# Patient Record
Sex: MALE | Race: WHITE | URBAN - METROPOLITAN AREA
[De-identification: names, ages, dates, MRNs, and addresses within clinical notes are randomized per-mention and may not be internally consistent; named-entity substitution may affect disease eponyms.]

---

## 2024-07-23 NOTE — H&P
HPI:     Berry Evangelista is a 34 year old male who presents as a consult for vasectomy.    Number of children: 2    He desires permanent sterility via bilateral vasectomy. He understands the risks of bleeding, infection, chronic pain, atrophy, the one in 1000 risk of recanalization. He understands that he should continue to use contraception until we have a semen sample showing no sperm present. He also understands the need to wear a scrotal supporter for a minimum of one week. He also understands that he is not to lift anything heavier than 10 pounds for the next 4 days.  Patient was given vasectomy information and office protocol pre-operative and post-operative instructions.      exam: easily palpable vas bilaterally    Schedule for vasectomy.    HISTORY:  History reviewed. No pertinent past medical history.   History reviewed. No pertinent surgical history.   History reviewed. No pertinent family history.   Social History:   Social History     Socioeconomic History    Marital status:         Medications (Active prior to today's visit):  Current Outpatient Medications   Medication Sig Dispense Refill    Fexofenadine HCl (ALLERGY 24-HR OR) Take by mouth.      diazePAM (VALIUM) 10 MG Oral Tab Take 1 tablet (10 mg total) by mouth See Admin Instructions. Take 1-2 tablets by mouth 20-30 minutes before procedure. 2 tablet 0    traMADol 50 MG Oral Tab Take 1 tablet (50 mg total) by mouth every 6 (six) hours as needed for Pain. 15 tablet 0       Allergies:  No Known Allergies      ROS:     A comprehensive 10 point review of systems was completed.  Pertinent positives and negatives noted in the the HPI.    PHYSICAL EXAM:     GENERAL APPEARANCE: well, developed, well nourished, in no acute distress  NEUROLOGIC: nonfocal, alert and oriented  HEAD: normocephalic, atraumatic  EYES: sclera non-icteric  EARS: hearing intact  ORAL CAVITY: mucosa moist  NECK/THYROID: no obvious goiter or masses  LUNGS: nonlabored  breathing  ABDOMEN: soft, no obvious masses or tenderness  SKIN: no obvious rashes    : as noted above     ASSESSMENT/PLAN:   Diagnoses and all orders for this visit:    Encounter for sterilization  -     diazePAM (VALIUM) 10 MG Oral Tab; Take 1 tablet (10 mg total) by mouth See Admin Instructions. Take 1-2 tablets by mouth 20-30 minutes before procedure.  -     traMADol 50 MG Oral Tab; Take 1 tablet (50 mg total) by mouth every 6 (six) hours as needed for Pain.    - as noted above    Thanks again for this consult.    Patrick Whaley MD, FACS  Urologist  [facility]

## 2024-07-30 ENCOUNTER — OFFICE VISIT (OUTPATIENT)
Dept: SURGERY | Facility: CLINIC | Age: 34
End: 2024-07-30

## 2024-07-30 ENCOUNTER — TELEPHONE (OUTPATIENT)
Dept: SURGERY | Facility: CLINIC | Age: 34
End: 2024-07-30

## 2024-07-30 DIAGNOSIS — Z30.2 ENCOUNTER FOR STERILIZATION: Primary | ICD-10-CM

## 2024-07-30 PROCEDURE — 99244 OFF/OP CNSLTJ NEW/EST MOD 40: CPT | Performed by: UROLOGY

## 2024-07-30 RX ORDER — DIAZEPAM 10 MG/1
10 TABLET ORAL SEE ADMIN INSTRUCTIONS
Qty: 2 TABLET | Refills: 0 | Status: SHIPPED | OUTPATIENT
Start: 2024-07-30

## 2024-07-30 RX ORDER — TRAMADOL HYDROCHLORIDE 50 MG/1
50 TABLET ORAL EVERY 6 HOURS PRN
Qty: 15 TABLET | Refills: 0 | Status: SHIPPED | OUTPATIENT
Start: 2024-07-30

## 2024-07-30 NOTE — TELEPHONE ENCOUNTER
Per patient due to conflict of his schedule he needs to reschedule his vasectomy appointment. Please advise

## 2024-09-09 NOTE — PROGRESS NOTES
HPI:     Berry Evangelista is a 34 year old male who presents as a consult for vasectomy.    Number of children: 2    He desires permanent sterility via bilateral vasectomy. He understands the risks of bleeding, infection, chronic pain, atrophy, the one in 1000 risk of recanalization. He understands that he should continue to use contraception until we have a semen sample showing no sperm present. He also understands the need to wear a scrotal supporter for a minimum of one week. He also understands that he is not to lift anything heavier than 10 pounds for the next 4 days.  Patient was given vasectomy information and office protocol pre-operative and post-operative instructions.      exam: easily palpable vas bilaterally    Schedule for vasectomy.    HISTORY:  No past medical history on file.   No past surgical history on file.   No family history on file.   Social History:   Social History     Socioeconomic History    Marital status:         Medications (Active prior to today's visit):  Current Outpatient Medications   Medication Sig Dispense Refill    Fexofenadine HCl (ALLERGY 24-HR OR) Take by mouth.      diazePAM (VALIUM) 10 MG Oral Tab Take 1 tablet (10 mg total) by mouth See Admin Instructions. Take 1-2 tablets by mouth 20-30 minutes before procedure. 2 tablet 0    traMADol 50 MG Oral Tab Take 1 tablet (50 mg total) by mouth every 6 (six) hours as needed for Pain. 15 tablet 0       Allergies:  No Known Allergies      ROS:     A comprehensive 10 point review of systems was completed.  Pertinent positives and negatives noted in the the HPI.    PHYSICAL EXAM:     GENERAL APPEARANCE: well, developed, well nourished, in no acute distress  NEUROLOGIC: nonfocal, alert and oriented  HEAD: normocephalic, atraumatic  EYES: sclera non-icteric  EARS: hearing intact  ORAL CAVITY: mucosa moist  NECK/THYROID: no obvious goiter or masses  LUNGS: nonlabored breathing  ABDOMEN: soft, no obvious masses or tenderness  SKIN:  no obvious rashes    : as noted above     ASSESSMENT/PLAN:   Diagnoses and all orders for this visit:    Encounter for sterilization  -     Semen Analysis, Postvas; Future  -     REMOVAL OF SPERM DUCT(S)    - as noted above    Thanks again for this consult.    Patrick Whaley MD, FACS  Urologist  [facility]

## 2024-09-17 ENCOUNTER — PROCEDURE (OUTPATIENT)
Dept: SURGERY | Facility: CLINIC | Age: 34
End: 2024-09-17
Payer: COMMERCIAL

## 2024-09-17 VITALS — DIASTOLIC BLOOD PRESSURE: 76 MMHG | SYSTOLIC BLOOD PRESSURE: 136 MMHG

## 2024-09-17 DIAGNOSIS — Z30.2 ENCOUNTER FOR STERILIZATION: Primary | ICD-10-CM

## 2024-09-17 PROCEDURE — 55250 REMOVAL OF SPERM DUCT(S): CPT | Performed by: UROLOGY

## 2024-09-17 PROCEDURE — 3078F DIAST BP <80 MM HG: CPT | Performed by: UROLOGY

## 2024-09-17 PROCEDURE — 3075F SYST BP GE 130 - 139MM HG: CPT | Performed by: UROLOGY

## 2024-09-24 ENCOUNTER — TELEPHONE (OUTPATIENT)
Dept: SURGERY | Facility: CLINIC | Age: 34
End: 2024-09-24

## 2024-09-24 NOTE — TELEPHONE ENCOUNTER
----- Message from Newsle  sent at 9/23/2024 12:01 PM CDT -----  Regarding: Soreness and Swelling  Contact: [phone]  Specifically Seems to be a hardish lump above the testicle that is inflamed.

## 2024-09-24 NOTE — TELEPHONE ENCOUNTER
Contact: [phone]   (Vasectomy 9/17/2024 Dr. Whaley))  Patient verbalizes feeling better today.  C/O Lump incision site above left testicle. - was ping pong size for the past 4 days now swelling went down and is half the size.  Above left testicle area.  Denies redness , Denies warm to touch, Regino drainage, incision site healing per patient.  States area was very painful over the weekend and today less painful.  Swelling went down in size by half.  Taking advil for swelling 1-2 tabs about Twice a day with maybe with some relief now.  Has not taken tramadol - patient verbalizes has not needed to take.  Denies fever.  Denies difficulty urinating - no issues urinating.  Is wearing supportive underwear.  Instructed patient to continue to monitor /  if symptoms or swelling changes or worse or does not continue to decrease, or any signs symptoms infection (fever, warm to touch, increase pain, ect...) then patient to contact office ASAP.  Patient verbalizes understanding.  Patient asking if he can increase activity  exercise? Verbalizes was given instructions to wait 4 days.  Instructed patient to continue to hold off increasing activity/exercise at present time until feeling better - take it slow and continue to heal for now until feeling better. Patient verbalizes understanding  Message forwarded to Dr. Whaley as SHERMAN

## 2025-02-11 ENCOUNTER — TELEPHONE (OUTPATIENT)
Dept: SURGERY | Facility: CLINIC | Age: 35
End: 2025-02-11